# Patient Record
Sex: MALE | Race: WHITE | NOT HISPANIC OR LATINO | Employment: OTHER | URBAN - METROPOLITAN AREA
[De-identification: names, ages, dates, MRNs, and addresses within clinical notes are randomized per-mention and may not be internally consistent; named-entity substitution may affect disease eponyms.]

---

## 2023-09-03 ENCOUNTER — APPOINTMENT (OUTPATIENT)
Dept: RADIOLOGY | Facility: MEDICAL CENTER | Age: 73
End: 2023-09-03
Attending: EMERGENCY MEDICINE
Payer: MEDICARE

## 2023-09-03 ENCOUNTER — HOSPITAL ENCOUNTER (OUTPATIENT)
Facility: MEDICAL CENTER | Age: 73
End: 2023-09-04
Attending: EMERGENCY MEDICINE | Admitting: INTERNAL MEDICINE
Payer: MEDICARE

## 2023-09-03 DIAGNOSIS — I10 HYPERTENSION, UNSPECIFIED TYPE: ICD-10-CM

## 2023-09-03 DIAGNOSIS — R41.82 ALTERED MENTAL STATUS, UNSPECIFIED ALTERED MENTAL STATUS TYPE: ICD-10-CM

## 2023-09-03 DIAGNOSIS — Z79.4 CONTROLLED TYPE 2 DIABETES MELLITUS WITHOUT COMPLICATION, WITH LONG-TERM CURRENT USE OF INSULIN (HCC): ICD-10-CM

## 2023-09-03 DIAGNOSIS — E11.9 CONTROLLED TYPE 2 DIABETES MELLITUS WITHOUT COMPLICATION, WITH LONG-TERM CURRENT USE OF INSULIN (HCC): ICD-10-CM

## 2023-09-03 PROBLEM — Z85.048 PERSONAL HISTORY OF RECTAL CANCER: Status: ACTIVE | Noted: 2023-09-03

## 2023-09-03 LAB
ALBUMIN SERPL BCP-MCNC: 4 G/DL (ref 3.2–4.9)
ALBUMIN/GLOB SERPL: 1.5 G/DL
ALP SERPL-CCNC: 76 U/L (ref 30–99)
ALT SERPL-CCNC: 18 U/L (ref 2–50)
AMMONIA PLAS-SCNC: 16 UMOL/L (ref 11–45)
AMPHET UR QL SCN: NEGATIVE
ANION GAP SERPL CALC-SCNC: 7 MMOL/L (ref 7–16)
APPEARANCE UR: CLEAR
APTT PPP: 26.4 SEC (ref 24.7–36)
AST SERPL-CCNC: 21 U/L (ref 12–45)
BACTERIA #/AREA URNS HPF: NEGATIVE /HPF
BARBITURATES UR QL SCN: NEGATIVE
BASOPHILS # BLD AUTO: 0.5 % (ref 0–1.8)
BASOPHILS # BLD: 0.03 K/UL (ref 0–0.12)
BENZODIAZ UR QL SCN: NEGATIVE
BILIRUB SERPL-MCNC: 0.3 MG/DL (ref 0.1–1.5)
BILIRUB UR QL STRIP.AUTO: NEGATIVE
BUN SERPL-MCNC: 19 MG/DL (ref 8–22)
BZE UR QL SCN: NEGATIVE
CALCIUM ALBUM COR SERPL-MCNC: 9.4 MG/DL (ref 8.5–10.5)
CALCIUM SERPL-MCNC: 9.4 MG/DL (ref 8.5–10.5)
CANNABINOIDS UR QL SCN: NEGATIVE
CHLORIDE SERPL-SCNC: 107 MMOL/L (ref 96–112)
CK SERPL-CCNC: 106 U/L (ref 0–154)
CO2 SERPL-SCNC: 25 MMOL/L (ref 20–33)
COLOR UR: YELLOW
CREAT SERPL-MCNC: 0.75 MG/DL (ref 0.5–1.4)
EKG IMPRESSION: NORMAL
EOSINOPHIL # BLD AUTO: 0.42 K/UL (ref 0–0.51)
EOSINOPHIL NFR BLD: 6.6 % (ref 0–6.9)
EPI CELLS #/AREA URNS HPF: NEGATIVE /HPF
ERYTHROCYTE [DISTWIDTH] IN BLOOD BY AUTOMATED COUNT: 52.4 FL (ref 35.9–50)
ETHANOL BLD-MCNC: <10.1 MG/DL
FENTANYL UR QL: NEGATIVE
GFR SERPLBLD CREATININE-BSD FMLA CKD-EPI: 96 ML/MIN/1.73 M 2
GLOBULIN SER CALC-MCNC: 2.6 G/DL (ref 1.9–3.5)
GLUCOSE BLD STRIP.AUTO-MCNC: 239 MG/DL (ref 65–99)
GLUCOSE SERPL-MCNC: 151 MG/DL (ref 65–99)
GLUCOSE UR STRIP.AUTO-MCNC: 100 MG/DL
HCT VFR BLD AUTO: 36.9 % (ref 42–52)
HGB BLD-MCNC: 12.2 G/DL (ref 14–18)
HYALINE CASTS #/AREA URNS LPF: ABNORMAL /LPF
IMM GRANULOCYTES # BLD AUTO: 0.02 K/UL (ref 0–0.11)
IMM GRANULOCYTES NFR BLD AUTO: 0.3 % (ref 0–0.9)
INR PPP: 1.04 (ref 0.87–1.13)
KETONES UR STRIP.AUTO-MCNC: NEGATIVE MG/DL
LEUKOCYTE ESTERASE UR QL STRIP.AUTO: NEGATIVE
LIPASE SERPL-CCNC: 17 U/L (ref 11–82)
LYMPHOCYTES # BLD AUTO: 1.82 K/UL (ref 1–4.8)
LYMPHOCYTES NFR BLD: 28.6 % (ref 22–41)
MAGNESIUM SERPL-MCNC: 2.1 MG/DL (ref 1.5–2.5)
MCH RBC QN AUTO: 34.1 PG (ref 27–33)
MCHC RBC AUTO-ENTMCNC: 33.1 G/DL (ref 32.3–36.5)
MCV RBC AUTO: 103.1 FL (ref 81.4–97.8)
METHADONE UR QL SCN: NEGATIVE
MICRO URNS: ABNORMAL
MONOCYTES # BLD AUTO: 0.59 K/UL (ref 0–0.85)
MONOCYTES NFR BLD AUTO: 9.3 % (ref 0–13.4)
NEUTROPHILS # BLD AUTO: 3.48 K/UL (ref 1.82–7.42)
NEUTROPHILS NFR BLD: 54.7 % (ref 44–72)
NITRITE UR QL STRIP.AUTO: NEGATIVE
NRBC # BLD AUTO: 0 K/UL
NRBC BLD-RTO: 0 /100 WBC (ref 0–0.2)
OPIATES UR QL SCN: NEGATIVE
OXYCODONE UR QL SCN: NEGATIVE
PCP UR QL SCN: NEGATIVE
PH UR STRIP.AUTO: 5.5 [PH] (ref 5–8)
PHOSPHATE SERPL-MCNC: 3.1 MG/DL (ref 2.5–4.5)
PLATELET # BLD AUTO: 171 K/UL (ref 164–446)
PMV BLD AUTO: 11 FL (ref 9–12.9)
POTASSIUM SERPL-SCNC: 4 MMOL/L (ref 3.6–5.5)
PROPOXYPH UR QL SCN: NEGATIVE
PROT SERPL-MCNC: 6.6 G/DL (ref 6–8.2)
PROT UR QL STRIP: NEGATIVE MG/DL
PROTHROMBIN TIME: 13.8 SEC (ref 12–14.6)
RBC # BLD AUTO: 3.58 M/UL (ref 4.7–6.1)
RBC # URNS HPF: ABNORMAL /HPF
RBC UR QL AUTO: ABNORMAL
SODIUM SERPL-SCNC: 139 MMOL/L (ref 135–145)
SP GR UR STRIP.AUTO: 1.02
TROPONIN T SERPL-MCNC: 18 NG/L (ref 6–19)
TSH SERPL DL<=0.005 MIU/L-ACNC: 0.93 UIU/ML (ref 0.38–5.33)
UROBILINOGEN UR STRIP.AUTO-MCNC: 0.2 MG/DL
WBC # BLD AUTO: 6.4 K/UL (ref 4.8–10.8)
WBC #/AREA URNS HPF: ABNORMAL /HPF

## 2023-09-03 PROCEDURE — 71045 X-RAY EXAM CHEST 1 VIEW: CPT

## 2023-09-03 PROCEDURE — 85025 COMPLETE CBC W/AUTO DIFF WBC: CPT

## 2023-09-03 PROCEDURE — 80053 COMPREHEN METABOLIC PANEL: CPT

## 2023-09-03 PROCEDURE — 87040 BLOOD CULTURE FOR BACTERIA: CPT

## 2023-09-03 PROCEDURE — A9270 NON-COVERED ITEM OR SERVICE: HCPCS | Performed by: EMERGENCY MEDICINE

## 2023-09-03 PROCEDURE — 82077 ASSAY SPEC XCP UR&BREATH IA: CPT

## 2023-09-03 PROCEDURE — 82550 ASSAY OF CK (CPK): CPT

## 2023-09-03 PROCEDURE — 84484 ASSAY OF TROPONIN QUANT: CPT

## 2023-09-03 PROCEDURE — 85610 PROTHROMBIN TIME: CPT

## 2023-09-03 PROCEDURE — 700105 HCHG RX REV CODE 258: Performed by: EMERGENCY MEDICINE

## 2023-09-03 PROCEDURE — 83690 ASSAY OF LIPASE: CPT

## 2023-09-03 PROCEDURE — 84100 ASSAY OF PHOSPHORUS: CPT

## 2023-09-03 PROCEDURE — A9270 NON-COVERED ITEM OR SERVICE: HCPCS | Performed by: INTERNAL MEDICINE

## 2023-09-03 PROCEDURE — 84443 ASSAY THYROID STIM HORMONE: CPT

## 2023-09-03 PROCEDURE — 99285 EMERGENCY DEPT VISIT HI MDM: CPT

## 2023-09-03 PROCEDURE — 81001 URINALYSIS AUTO W/SCOPE: CPT | Mod: XU

## 2023-09-03 PROCEDURE — 70450 CT HEAD/BRAIN W/O DYE: CPT

## 2023-09-03 PROCEDURE — 700111 HCHG RX REV CODE 636 W/ 250 OVERRIDE (IP): Mod: JZ | Performed by: INTERNAL MEDICINE

## 2023-09-03 PROCEDURE — 99223 1ST HOSP IP/OBS HIGH 75: CPT | Performed by: INTERNAL MEDICINE

## 2023-09-03 PROCEDURE — 36415 COLL VENOUS BLD VENIPUNCTURE: CPT

## 2023-09-03 PROCEDURE — 700102 HCHG RX REV CODE 250 W/ 637 OVERRIDE(OP): Performed by: EMERGENCY MEDICINE

## 2023-09-03 PROCEDURE — 93005 ELECTROCARDIOGRAM TRACING: CPT | Performed by: EMERGENCY MEDICINE

## 2023-09-03 PROCEDURE — 700102 HCHG RX REV CODE 250 W/ 637 OVERRIDE(OP): Performed by: INTERNAL MEDICINE

## 2023-09-03 PROCEDURE — G0378 HOSPITAL OBSERVATION PER HR: HCPCS

## 2023-09-03 PROCEDURE — 83735 ASSAY OF MAGNESIUM: CPT

## 2023-09-03 PROCEDURE — 85730 THROMBOPLASTIN TIME PARTIAL: CPT

## 2023-09-03 PROCEDURE — 82962 GLUCOSE BLOOD TEST: CPT

## 2023-09-03 PROCEDURE — 80307 DRUG TEST PRSMV CHEM ANLYZR: CPT

## 2023-09-03 PROCEDURE — 82140 ASSAY OF AMMONIA: CPT

## 2023-09-03 PROCEDURE — 96372 THER/PROPH/DIAG INJ SC/IM: CPT | Mod: XU

## 2023-09-03 RX ORDER — AMLODIPINE BESYLATE 5 MG/1
5 TABLET ORAL DAILY
Status: ON HOLD | COMMUNITY
End: 2023-09-04 | Stop reason: SDUPTHER

## 2023-09-03 RX ORDER — METOPROLOL SUCCINATE 25 MG/1
25 TABLET, EXTENDED RELEASE ORAL DAILY
Status: ON HOLD | COMMUNITY
End: 2023-09-04 | Stop reason: SDUPTHER

## 2023-09-03 RX ORDER — POLYETHYLENE GLYCOL 3350 17 G/17G
1 POWDER, FOR SOLUTION ORAL
Status: DISCONTINUED | OUTPATIENT
Start: 2023-09-03 | End: 2023-09-04 | Stop reason: HOSPADM

## 2023-09-03 RX ORDER — ISOSORBIDE MONONITRATE 30 MG/1
30 TABLET, EXTENDED RELEASE ORAL EVERY MORNING
Status: ON HOLD | COMMUNITY
End: 2023-09-04 | Stop reason: SDUPTHER

## 2023-09-03 RX ORDER — GAUZE BANDAGE 2" X 2"
100 BANDAGE TOPICAL DAILY
Status: DISCONTINUED | OUTPATIENT
Start: 2023-09-04 | End: 2023-09-04 | Stop reason: HOSPADM

## 2023-09-03 RX ORDER — LISINOPRIL 40 MG/1
40 TABLET ORAL DAILY
Status: ON HOLD | COMMUNITY
End: 2023-09-04 | Stop reason: SDUPTHER

## 2023-09-03 RX ORDER — LISINOPRIL 10 MG/1
5 TABLET ORAL ONCE
Status: COMPLETED | OUTPATIENT
Start: 2023-09-03 | End: 2023-09-03

## 2023-09-03 RX ORDER — ROSUVASTATIN CALCIUM 10 MG/1
10 TABLET, COATED ORAL EVERY EVENING
Status: ON HOLD | COMMUNITY
End: 2023-09-04 | Stop reason: SDUPTHER

## 2023-09-03 RX ORDER — LISINOPRIL 20 MG/1
40 TABLET ORAL DAILY
Status: DISCONTINUED | OUTPATIENT
Start: 2023-09-03 | End: 2023-09-04 | Stop reason: HOSPADM

## 2023-09-03 RX ORDER — ONDANSETRON 4 MG/1
4 TABLET, ORALLY DISINTEGRATING ORAL EVERY 4 HOURS PRN
Status: DISCONTINUED | OUTPATIENT
Start: 2023-09-03 | End: 2023-09-04 | Stop reason: HOSPADM

## 2023-09-03 RX ORDER — INSULIN GLARGINE 100 [IU]/ML
35 INJECTION, SOLUTION SUBCUTANEOUS 2 TIMES DAILY
Status: ON HOLD | COMMUNITY
End: 2023-09-04 | Stop reason: SDUPTHER

## 2023-09-03 RX ORDER — ENOXAPARIN SODIUM 100 MG/ML
40 INJECTION SUBCUTANEOUS DAILY
Status: DISCONTINUED | OUTPATIENT
Start: 2023-09-03 | End: 2023-09-04 | Stop reason: HOSPADM

## 2023-09-03 RX ORDER — HYDRALAZINE HYDROCHLORIDE 20 MG/ML
10 INJECTION INTRAMUSCULAR; INTRAVENOUS ONCE
Status: DISCONTINUED | OUTPATIENT
Start: 2023-09-03 | End: 2023-09-04 | Stop reason: HOSPADM

## 2023-09-03 RX ORDER — ACETAMINOPHEN 325 MG/1
650 TABLET ORAL EVERY 6 HOURS PRN
Status: DISCONTINUED | OUTPATIENT
Start: 2023-09-03 | End: 2023-09-04 | Stop reason: HOSPADM

## 2023-09-03 RX ORDER — INSULIN ASPART 100 [IU]/ML
10-25 INJECTION, SOLUTION INTRAVENOUS; SUBCUTANEOUS 2 TIMES DAILY
Status: ON HOLD | COMMUNITY
End: 2023-09-04 | Stop reason: SDUPTHER

## 2023-09-03 RX ORDER — AMOXICILLIN 250 MG
2 CAPSULE ORAL 2 TIMES DAILY
Status: DISCONTINUED | OUTPATIENT
Start: 2023-09-03 | End: 2023-09-04 | Stop reason: HOSPADM

## 2023-09-03 RX ORDER — SODIUM CHLORIDE 9 MG/ML
INJECTION, SOLUTION INTRAVENOUS CONTINUOUS
Status: DISCONTINUED | OUTPATIENT
Start: 2023-09-03 | End: 2023-09-04 | Stop reason: HOSPADM

## 2023-09-03 RX ORDER — ONDANSETRON 2 MG/ML
4 INJECTION INTRAMUSCULAR; INTRAVENOUS EVERY 4 HOURS PRN
Status: DISCONTINUED | OUTPATIENT
Start: 2023-09-03 | End: 2023-09-04 | Stop reason: HOSPADM

## 2023-09-03 RX ORDER — BISACODYL 10 MG
10 SUPPOSITORY, RECTAL RECTAL
Status: DISCONTINUED | OUTPATIENT
Start: 2023-09-03 | End: 2023-09-04 | Stop reason: HOSPADM

## 2023-09-03 RX ORDER — DEXTROSE MONOHYDRATE 25 G/50ML
25 INJECTION, SOLUTION INTRAVENOUS
Status: DISCONTINUED | OUTPATIENT
Start: 2023-09-03 | End: 2023-09-04 | Stop reason: HOSPADM

## 2023-09-03 RX ADMIN — INSULIN HUMAN 2 UNITS: 100 INJECTION, SOLUTION PARENTERAL at 21:51

## 2023-09-03 RX ADMIN — LISINOPRIL 40 MG: 20 TABLET ORAL at 19:34

## 2023-09-03 RX ADMIN — LISINOPRIL 5 MG: 10 TABLET ORAL at 16:41

## 2023-09-03 RX ADMIN — TICAGRELOR 60 MG: 60 TABLET ORAL at 21:51

## 2023-09-03 RX ADMIN — METOPROLOL TARTRATE 25 MG: 25 TABLET, FILM COATED ORAL at 19:34

## 2023-09-03 RX ADMIN — ENOXAPARIN SODIUM 40 MG: 100 INJECTION SUBCUTANEOUS at 21:45

## 2023-09-03 RX ADMIN — SODIUM CHLORIDE: 9 INJECTION, SOLUTION INTRAVENOUS at 17:40

## 2023-09-03 ASSESSMENT — ENCOUNTER SYMPTOMS
CHILLS: 0
FLANK PAIN: 0
COUGH: 0
SPEECH CHANGE: 0
POLYDIPSIA: 0
HEARTBURN: 0
SPUTUM PRODUCTION: 0
ORTHOPNEA: 0
PHOTOPHOBIA: 0
DOUBLE VISION: 0
FEVER: 0
VOMITING: 0
WEIGHT LOSS: 0
BRUISES/BLEEDS EASILY: 0
HEMOPTYSIS: 0
HALLUCINATIONS: 0
BACK PAIN: 0
NERVOUS/ANXIOUS: 0
HEADACHES: 0
FOCAL WEAKNESS: 0
NECK PAIN: 0
TREMORS: 0
NAUSEA: 0
PALPITATIONS: 0
BLURRED VISION: 0

## 2023-09-03 ASSESSMENT — LIFESTYLE VARIABLES
DO YOU DRINK ALCOHOL: NO
SUBSTANCE_ABUSE: 0

## 2023-09-03 ASSESSMENT — FIBROSIS 4 INDEX: FIB4 SCORE: 2.08

## 2023-09-03 ASSESSMENT — PAIN DESCRIPTION - PAIN TYPE: TYPE: ACUTE PAIN

## 2023-09-03 NOTE — ED NOTES
Pt ambulated in with [facility] transfer staff.  Pt was found wandering around for the last 3 days and couldn't find his way back to his camp.  Pt A/O x4.  Pt hasn't has any of his medications for the last 3 days.  H/o DM,  PTA.  Pt rec'd 300 ml NS.  Pt also has h/o colon CA and last Chemo 3 wks ago.  ERP to see.

## 2023-09-03 NOTE — ED PROVIDER NOTES
ER Provider Note    Scribed for Yoselyn Freire M.d. by Marlyn Galvan. 9/3/2023  2:06 PM    Primary Care Provider: No primary care provider noted.    CHIEF COMPLAINT  Chief Complaint   Patient presents with    Other     EXTERNAL RECORDS REVIEWED  Outpatient Notes The patient is a transfer from [facility]. He has a history of colon cancer and CAD. The patient went alone and lost his RV 3 days ago. He has been out there ever since. A&O x1. The patient is supposed to be on lisinopril for his blood pressure.     Old records from [facility] were reviewed.  Patient has history of localized advanced rectal adenocarcinoma.  He is status post 6 cycles of full ferry.  MRI suggested extramural vascular invasion and at least 4 mesorectal/presacral lymph nodes measuring up to 5 mm concerning for malignancy.  Patient was seen there on August 16 in preparation for radiation planning.  Radiation planning is for rectum and pelvic nodes with concurrent oral Xeloda.  Patient's medication list includes Norvasc, insulin, metoprolol, Januvia as of August 17, 2023.    Gastroenterology H&P done on June 29, 2023 states that patient's past medical history includes bilateral carotid artery stenosis, CAD with stents to the LAD and OM.  Patient with chronic venous insufficiency of lower extremity.  He has hyperlipidemia, hypertension and PAD.  He has left popliteal and peroneal artery stenosis with occluded left posterior tibial artery.  He also has diabetes type 2 with diabetic neuropathy.  That particular H&P also includes aspirin, Lipitor, lisinopril and Brilinta as current medications.    HPI/ROS  LIMITATION TO HISTORY   Select: : None  OUTSIDE HISTORIAN(S):  None.     Orlando Faustin is a 73 y.o. male who presents to the ED for evaluation of altered level of consciousness onset earlier today. Per nursing note, the patient was found wandering around for the last 3 days and was unable to find his RV. The patient has not taken any  "of his medications for the last 3 days due to his medications being in his van. He describes that he is not confused and that the rangers were concerned due to the patient not taking his medication. The patient states that his RV has broken down and simply did not write down where he had left his van, so he had forgotten where his van was. The patient states that he first rode a bike to look for his RV, and then he walked and took a shuttle. During the 3 days, the patient was staying with others while searching for his RV. He states that he experienced shortness of breath after riding the bike for a while, which he reports is typical. He also notes that he gets winded after walking up the stairs and states that his doctor has been made aware of this. The patient knows location, month, and year, but is unaware as to who the president is.  He reports that \"Mauricio is the president.\"  He reports that he normally lives in [place]. When asked about who cares for his cancer, at first he denied having anyone treating him for his colon cancer, but then he noted that he has had chemotherapy done. The patient reports that he would receive chemotherapy weekly, but has not gone in about 2 to 3 weeks, as he states that he is being changed to radiation. He states that he was diagnosed with colon cancer last year. The patient has a port to his right chest wall. The patient states he also has slight lightheadedness, but denies any chest pain, cough, abdominal pain, sore throat, or dizziness. The patient states that he experienced the lightheadedness after walking for a while. He denies the use of drugs at [place]. He notes that he is on a blood thinner and takes lisinopril.     PAST MEDICAL HISTORY  Past Medical History:   Diagnosis Date    Cancer (HCC)     Diabetes (HCC)     Hypertension      SURGICAL HISTORY  History reviewed. No pertinent surgical history.    FAMILY HISTORY  History reviewed. No pertinent family " "history.    SOCIAL HISTORY   reports that he has never smoked. He has never used smokeless tobacco. He reports current alcohol use. He reports that he does not currently use drugs.    CURRENT MEDICATIONS  Current Outpatient Medications   Medication Instructions    LISINOPRIL PO Oral    SITagliptin Phosphate (JANUVIA PO) Oral    Ticagrelor (BRILINTA PO) Oral      ALLERGIES  Patient has no known allergies.    PHYSICAL EXAM  BP (!) 180/84   Pulse 62   Temp 36.4 °C (97.6 °F) (Temporal)   Resp 16   Ht 1.803 m (5' 11\")   Wt 81.6 kg (180 lb)   SpO2 98%   BMI 25.10 kg/m²     Constitutional: Well developed, well nourished; No acute distress; Non-toxic appearance.  Covered in [place] mud.  HENT: Normocephalic, atraumatic; Bilateral external ears normal; Oropharynx with moist mucous membranes;   Eyes: PERRL, EOMI, Conjunctiva normal. No discharge.   Neck:  Supple, nontender midline; No stridor; No nuchal rigidity.   Cardiovascular: Regular rate and rhythm without murmurs, rubs, or gallop.   Thorax & Lungs: No respiratory distress, breath sounds clear to auscultation bilaterally without wheezing, rales or rhonchi. Nontender chest wall. No crepitus or subcutaneous air. Port in the right chest wall.   Abdomen: Soft, nontender, bowel sounds normal. No obvious masses; No pulsatile masses; no rebound, guarding, or peritoneal signs. Mild distension of the abdomen.   Skin: Good color; warm and dry without rash or petechia.  Back: Nontender, No CVA tenderness.   Extremities: Distal radial, dorsalis pedis, posterior tibial pulses are equal bilaterally; Nontender calves or saphenous, No cyanosis, No clubbing. Feet are covered with mud. 2+ pitting edema to ankles.  Musculoskeletal: Good range of motion in all major joints. No tenderness to palpation or major deformities noted.   Neurologic: Alert & oriented x 4, clear speech, normal gait.   are 5 out of 5 and equal bilaterally.  No facial asymmetry.    DIAGNOSTIC " STUDIES    Labs:   Results for orders placed or performed during the hospital encounter of 09/03/23   MAGNESIUM   Result Value Ref Range    Magnesium 2.1 1.5 - 2.5 mg/dL   CBC WITH DIFFERENTIAL   Result Value Ref Range    WBC 6.4 4.8 - 10.8 K/uL    RBC 3.58 (L) 4.70 - 6.10 M/uL    Hemoglobin 12.2 (L) 14.0 - 18.0 g/dL    Hematocrit 36.9 (L) 42.0 - 52.0 %    .1 (H) 81.4 - 97.8 fL    MCH 34.1 (H) 27.0 - 33.0 pg    MCHC 33.1 32.3 - 36.5 g/dL    RDW 52.4 (H) 35.9 - 50.0 fL    Platelet Count 171 164 - 446 K/uL    MPV 11.0 9.0 - 12.9 fL    Neutrophils-Polys 54.70 44.00 - 72.00 %    Lymphocytes 28.60 22.00 - 41.00 %    Monocytes 9.30 0.00 - 13.40 %    Eosinophils 6.60 0.00 - 6.90 %    Basophils 0.50 0.00 - 1.80 %    Immature Granulocytes 0.30 0.00 - 0.90 %    Nucleated RBC 0.00 0.00 - 0.20 /100 WBC    Neutrophils (Absolute) 3.48 1.82 - 7.42 K/uL    Lymphs (Absolute) 1.82 1.00 - 4.80 K/uL    Monos (Absolute) 0.59 0.00 - 0.85 K/uL    Eos (Absolute) 0.42 0.00 - 0.51 K/uL    Baso (Absolute) 0.03 0.00 - 0.12 K/uL    Immature Granulocytes (abs) 0.02 0.00 - 0.11 K/uL    NRBC (Absolute) 0.00 K/uL   COMP METABOLIC PANEL   Result Value Ref Range    Sodium 139 135 - 145 mmol/L    Potassium 4.0 3.6 - 5.5 mmol/L    Chloride 107 96 - 112 mmol/L    Co2 25 20 - 33 mmol/L    Anion Gap 7.0 7.0 - 16.0    Glucose 151 (H) 65 - 99 mg/dL    Bun 19 8 - 22 mg/dL    Creatinine 0.75 0.50 - 1.40 mg/dL    Calcium 9.4 8.5 - 10.5 mg/dL    Correct Calcium 9.4 8.5 - 10.5 mg/dL    AST(SGOT) 21 12 - 45 U/L    ALT(SGPT) 18 2 - 50 U/L    Alkaline Phosphatase 76 30 - 99 U/L    Total Bilirubin 0.3 0.1 - 1.5 mg/dL    Albumin 4.0 3.2 - 4.9 g/dL    Total Protein 6.6 6.0 - 8.2 g/dL    Globulin 2.6 1.9 - 3.5 g/dL    A-G Ratio 1.5 g/dL   LIPASE   Result Value Ref Range    Lipase 17 11 - 82 U/L   TROPONIN   Result Value Ref Range    Troponin T 18 6 - 19 ng/L   URINALYSIS (UA)    Specimen: Urine   Result Value Ref Range    Color Yellow     Character Clear      Specific Gravity 1.018 <1.035    Ph 5.5 5.0 - 8.0    Glucose 100 (A) Negative mg/dL    Ketones Negative Negative mg/dL    Protein Negative Negative mg/dL    Bilirubin Negative Negative    Urobilinogen, Urine 0.2 Negative    Nitrite Negative Negative    Leukocyte Esterase Negative Negative    Occult Blood Trace (A) Negative    Micro Urine Req Microscopic    AMMONIA   Result Value Ref Range    Ammonia 16 11 - 45 umol/L   APTT   Result Value Ref Range    APTT 26.4 24.7 - 36.0 sec   PROTHROMBIN TIME (INR)   Result Value Ref Range    PT 13.8 12.0 - 14.6 sec    INR 1.04 0.87 - 1.13   URINE DRUG SCREEN   Result Value Ref Range    Amphetamines Urine Negative Negative    Barbiturates Negative Negative    Benzodiazepines Negative Negative    Cocaine Metabolite Negative Negative    Fentanyl, Urine Negative Negative    Methadone Negative Negative    Opiates Negative Negative    Oxycodone Negative Negative    Phencyclidine -Pcp Negative Negative    Propoxyphene Negative Negative    Cannabinoid Metab Negative Negative   DIAGNOSTIC ALCOHOL   Result Value Ref Range    Diagnostic Alcohol <10.1 <10.1 mg/dL   ESTIMATED GFR   Result Value Ref Range    GFR (CKD-EPI) 96 >60 mL/min/1.73 m 2   URINE MICROSCOPIC (W/UA)   Result Value Ref Range    WBC 0-2 (A) /hpf    RBC 0-2 (A) /hpf    Bacteria Negative None /hpf    Epithelial Cells Negative /hpf    Hyaline Cast 0-2 /lpf   TSH WITH REFLEX TO FT4   Result Value Ref Range    TSH 0.930 0.380 - 5.330 uIU/mL   CREATINE KINASE   Result Value Ref Range    CPK Total 106 0 - 154 U/L   PHOSPHORUS   Result Value Ref Range    Phosphorus 3.1 2.5 - 4.5 mg/dL   EKG (NOW)   Result Value Ref Range    Report       [facility] Emergency Dept.    Test Date:  2023  Pt Name:    ALICIA GRIFFITH                Department: ER  MRN:        [id number]                      Room:        21  Gender:     Male                         Technician: [id number]  :        1950                    Requested By:FRANCOISE FREIRE  Order #:    [id number]                    Reading MD: Francoise Freire    Measurements  Intervals                                Axis  Rate:       57                           P:          -33  TX:         169                          QRS:        23  QRSD:       96                           T:          53  QT:         442  QTc:        431    Interpretive Statements  Sinus bradycardia rate 57  Normal axis  Normal intervals  T waves flat throughout  No ST elevation or depression  Anteroseptal infarct, old  No previous ECG available for comparison  Electronically Signed On 09- 16:28:22 PDT by Francoise Freire     POCT glucose device results   Result Value Ref Range    POC Glucose, Blood 239 (H) 65 - 99 mg/dL      EKG:   I have independently interpreted this EKG  Please see my EKG interpretation above    Radiology:   The attending emergency physician has independently interpreted the diagnostic imaging associated with this visit and am waiting the final reading from the radiologist.     Preliminary interpretation is a follows: ER MD is reviewed the patient's CT brain.  No obvious head bleed.    Radiologist interpretation:   CT-HEAD W/O   Final Result         1.  No acute intracranial abnormality.         DX-CHEST-PORTABLE (1 VIEW)   Final Result      No acute cardiopulmonary abnormality.         COURSE & MEDICAL DECISION MAKING     ED Observation Status? No; the patient does not meet criteria for ED Observation.     INITIAL ASSESSMENT, COURSE AND PLAN  Care Narrative: Patient presents to the ER with concerns for confusion/altered mental status.  The patient is a colorectal cancer patient who follows with [facility] down in [place].  He was added [place] by himself.  He said he forgot where he parked his van and has been wandering around for 3 days.  Since he could not find his van, he simply just went into other peoples camps where they gave him food, clothes and shelter.  He ended up at a medical  "tent today due to concern that Rangers had over his confusion.  He was alert and orient x1 out of the medical tent and burning man.  It was reported he been off his medications and that was also of concern.  They transferred here to the ER for evaluation.  Here in the ER the patient is oriented to person, place, city of residence, year and month.  It took him a little while to think of the month but he was able to answer accurately.  When asked to the present was he said \"[place].\"  When I told him that the president was Lori, he seemed surprised.  Patient has had no headache.  No chest pain.  No shortness of breath.  No abdominal pain.  No complaints of numbness, tingling or weakness of extremities.  No focal deficits on examination.  At this time I do not think he isHaving stroke.  Patient is afebrile.  No concern for encephalitis or meningitis.  CT brain is negative for any acute head bleed.  EKG is nonacute.  Blood work is unremarkable.  Patient's blood pressure was quite high but again has been off his medications for 3 days.  Patient certainly seems to be improved from a mental status standpoint when compared to how he was described out it [place].  He has no wallet.  He has no cell phone.  His van is out of [place], and all of his belongings are apparently in his van.   has seen the patient and is aware of the situation.  At this time given patient's marked confusion is reported prior to arrival in his mild persistent confusion here in the ER, he will be hospitalized for further evaluation and work-up.  I spoke with the hospitalist on-call and he will kindly evaluate the patient hospitalization.     2:10 PM - Patient seen and examined at bedside. Discussed plan of care, including performing an EKG, lab work, and imaging. Patient agrees to the plan of care.     4:27 PM - The patient will be treated with lisinopril 5 mg.     4:51 PM - The patient will be treated with fluids and hydralazine 10 " mg.     5:28 PM - Patient was reevaluated at bedside. Discussed lab and radiology results with the patient and informed them that the CT of his head, as well as his labs are reassuring. I informed the patient that he will be hospitalized for the night so further evaluation can be done, as well as to ensure that he is fit to leave. He is agreeable to the plan of care.     HYDRATION: Based on the patient's presentation of Dehydration and Eld[place] ALOC the patient was given IV fluids. IV Hydration was used because oral hydration was not adequate alone. Upon recheck following hydration, the patient was improved.  ADDITIONAL PROBLEM LIST  Problem #1: Confusion/altered mental status  Primary 2: Hypertension    DISPOSITION AND DISCUSSIONS  I have discussed management of the patient with the following physicians and JOSSE's:  Dr. Ahumada (Hospitalist)    Discussion of management with other [facility] or appropriate source(s): None     Escalation of care considered, and ultimately not performed: diagnostic imaging.    Barriers to care at this time, including but not limited to: Patient is currently visiting from [place].  He was at a [place].  Wallet, keys, cell phone, vehicle all out of [place] and patient does not have any of his belongings.    Decision tools and prescription drugs considered including, but not limited to: NIH Stroke Scale 0 .    DISPOSITION:  Patient will be hospitalized by Dr. Ahumada in guarded condition.     FINAL DIAGNOSIS  1. Altered mental status, unspecified altered mental status type Acute   2. Hypertension, unspecified type Acute      I, Marlyn Galvan (Scribe), am scribing for, and in the presence of, Yoselyn Freire M.D..    Electronically signed by: Marlyn Galvan (Scribe), 9/3/2023    IYoselyn M.D. personally performed the services described in this documentation, as scribed by Marlyn Galvan in my presence, and it is both accurate and  complete.     This dictation has been created using voice recognition software. The accuracy of the dictation is limited by the abilities of the software. I expect there may be some errors of grammar and possibly content. I made every attempt to manually correct the errors within my dictation. However, errors related to voice recognition software may still exist and should be interpreted within the appropriate context.      The note accurately reflects work and decisions made by me.  Yoselyn Freire M.D.  9/3/2023  5:43 PM

## 2023-09-04 ENCOUNTER — PHARMACY VISIT (OUTPATIENT)
Dept: PHARMACY | Facility: MEDICAL CENTER | Age: 73
End: 2023-09-04
Payer: MEDICARE

## 2023-09-04 VITALS
BODY MASS INDEX: 26.57 KG/M2 | DIASTOLIC BLOOD PRESSURE: 77 MMHG | HEIGHT: 70 IN | WEIGHT: 185.63 LBS | OXYGEN SATURATION: 99 % | HEART RATE: 50 BPM | TEMPERATURE: 98 F | RESPIRATION RATE: 16 BRPM | SYSTOLIC BLOOD PRESSURE: 164 MMHG

## 2023-09-04 PROBLEM — R41.82 AMS (ALTERED MENTAL STATUS): Status: RESOLVED | Noted: 2023-09-03 | Resolved: 2023-09-04

## 2023-09-04 LAB — GLUCOSE BLD STRIP.AUTO-MCNC: 165 MG/DL (ref 65–99)

## 2023-09-04 PROCEDURE — 97602 WOUND(S) CARE NON-SELECTIVE: CPT

## 2023-09-04 PROCEDURE — 700102 HCHG RX REV CODE 250 W/ 637 OVERRIDE(OP): Performed by: INTERNAL MEDICINE

## 2023-09-04 PROCEDURE — 96372 THER/PROPH/DIAG INJ SC/IM: CPT | Mod: XU

## 2023-09-04 PROCEDURE — RXMED WILLOW AMBULATORY MEDICATION CHARGE: Performed by: NURSE PRACTITIONER

## 2023-09-04 PROCEDURE — 99239 HOSP IP/OBS DSCHRG MGMT >30: CPT | Mod: FS | Performed by: INTERNAL MEDICINE

## 2023-09-04 PROCEDURE — G0378 HOSPITAL OBSERVATION PER HR: HCPCS

## 2023-09-04 PROCEDURE — 700105 HCHG RX REV CODE 258: Performed by: EMERGENCY MEDICINE

## 2023-09-04 PROCEDURE — A9270 NON-COVERED ITEM OR SERVICE: HCPCS | Performed by: INTERNAL MEDICINE

## 2023-09-04 PROCEDURE — 82962 GLUCOSE BLOOD TEST: CPT

## 2023-09-04 RX ORDER — METOPROLOL SUCCINATE 25 MG/1
25 TABLET, EXTENDED RELEASE ORAL DAILY
Qty: 30 TABLET | Refills: 0 | Status: SHIPPED | OUTPATIENT
Start: 2023-09-04 | End: 2023-10-04

## 2023-09-04 RX ORDER — INSULIN GLARGINE 100 [IU]/ML
35 INJECTION, SOLUTION SUBCUTANEOUS 2 TIMES DAILY
Qty: 21 ML | Refills: 0 | Status: SHIPPED | OUTPATIENT
Start: 2023-09-04 | End: 2023-10-04

## 2023-09-04 RX ORDER — ISOSORBIDE MONONITRATE 30 MG/1
30 TABLET, EXTENDED RELEASE ORAL EVERY MORNING
Qty: 30 TABLET | Refills: 0 | Status: SHIPPED | OUTPATIENT
Start: 2023-09-04 | End: 2023-10-04

## 2023-09-04 RX ORDER — ROSUVASTATIN CALCIUM 10 MG/1
10 TABLET, COATED ORAL EVERY EVENING
Qty: 30 TABLET | Refills: 0 | Status: SHIPPED | OUTPATIENT
Start: 2023-09-04 | End: 2023-10-04

## 2023-09-04 RX ORDER — DIPHENHYDRAMINE HYDROCHLORIDE 25 MG/1
CAPSULE, LIQUID FILLED ORAL
Qty: 1 KIT | Refills: 0 | Status: SHIPPED | OUTPATIENT
Start: 2023-09-04

## 2023-09-04 RX ORDER — GLUCOSAMINE HCL/CHONDROITIN SU 500-400 MG
CAPSULE ORAL
Qty: 100 EACH | Refills: 0 | Status: SHIPPED | OUTPATIENT
Start: 2023-09-04

## 2023-09-04 RX ORDER — LISINOPRIL 40 MG/1
40 TABLET ORAL DAILY
Qty: 30 TABLET | Refills: 0 | Status: SHIPPED | OUTPATIENT
Start: 2023-09-04 | End: 2023-10-04

## 2023-09-04 RX ORDER — AMLODIPINE BESYLATE 5 MG/1
5 TABLET ORAL DAILY
Qty: 30 TABLET | Refills: 0 | Status: SHIPPED | OUTPATIENT
Start: 2023-09-04 | End: 2023-10-04

## 2023-09-04 RX ORDER — LANCETS 30 GAUGE
EACH MISCELLANEOUS
Qty: 100 EACH | Refills: 0 | Status: SHIPPED | OUTPATIENT
Start: 2023-09-04

## 2023-09-04 RX ADMIN — INSULIN HUMAN 1 UNITS: 100 INJECTION, SOLUTION PARENTERAL at 06:43

## 2023-09-04 RX ADMIN — Medication 100 MG: at 06:33

## 2023-09-04 RX ADMIN — TICAGRELOR 60 MG: 60 TABLET ORAL at 06:33

## 2023-09-04 RX ADMIN — METOPROLOL TARTRATE 25 MG: 25 TABLET, FILM COATED ORAL at 06:45

## 2023-09-04 RX ADMIN — SODIUM CHLORIDE: 9 INJECTION, SOLUTION INTRAVENOUS at 06:22

## 2023-09-04 ASSESSMENT — LIFESTYLE VARIABLES
HOW MANY TIMES IN THE PAST YEAR HAVE YOU HAD 5 OR MORE DRINKS IN A DAY: 0
TOTAL SCORE: 0
AVERAGE NUMBER OF DAYS PER WEEK YOU HAVE A DRINK CONTAINING ALCOHOL: 0
EVER HAD A DRINK FIRST THING IN THE MORNING TO STEADY YOUR NERVES TO GET RID OF A HANGOVER: NO
TOTAL SCORE: 0
ALCOHOL_USE: NO
ON A TYPICAL DAY WHEN YOU DRINK ALCOHOL HOW MANY DRINKS DO YOU HAVE: 0
HAVE PEOPLE ANNOYED YOU BY CRITICIZING YOUR DRINKING: NO
CONSUMPTION TOTAL: NEGATIVE
HAVE YOU EVER FELT YOU SHOULD CUT DOWN ON YOUR DRINKING: NO
EVER FELT BAD OR GUILTY ABOUT YOUR DRINKING: NO
TOTAL SCORE: 0

## 2023-09-04 ASSESSMENT — PATIENT HEALTH QUESTIONNAIRE - PHQ9
2. FEELING DOWN, DEPRESSED, IRRITABLE, OR HOPELESS: NOT AT ALL
SUM OF ALL RESPONSES TO PHQ9 QUESTIONS 1 AND 2: 0
1. LITTLE INTEREST OR PLEASURE IN DOING THINGS: NOT AT ALL

## 2023-09-04 NOTE — DISCHARGE PLANNING
Case Management Discharge Planning    Admission Date: 9/3/2023  GMLOS:    ALOS: 0    6-Clicks ADL Score:    6-Clicks Mobility Score:        Anticipated Discharge Dispo: Discharge Disposition: Discharged to home/self care (01) with close OP follow up    DME Needed: No    Action(s) Taken: Updated Provider/Nurse on Discharge Plan  This RN CM assisted Pt with Approved Service  for Diabetes supplies for the ff: True Metrix strips $75.80  and Lancets $8.70 and the total cost is $84.50.   ASF faxed to [facility].    Escalations Completed: None    Medically Clear: Yes    Next Steps:   CM to continue to assist Pt with discharge as needed    Barriers to Discharge: None    Is the patient up for discharge tomorrow: No

## 2023-09-04 NOTE — H&P
Hospital Medicine History & Physical Note    Date of Service  9/3/2023    Primary Care Physician  Pcp Pt States None      Code Status  Full Code    Chief Complaint  Chief Complaint   Patient presents with    Other       History of Presenting Illness  Orlando Faustin is a 73 y.o. male with past medical history of type 2 diabetes, recurrent colorectal cancer T3N2 status postchemotherapy, scheduled for radiation, CAD with stents placement, hypertension, bilateral internal carotid artery stenosis (greater than 70% of the left side), peripheral artery disease who presented 9/3/2023 with reported altered mental status.  Patient was brought from [facility] with concern for possible altered mental status.  It was reported that patient was he is well and has been looking for it for 3 days, staying in the other people tents, and eventually was drawn attention of medical team.  It was thought that he was alert oriented x1 today, however currently is alert active x4, although may be slightly confused.  He denies any confusion.  He denies to have focal deficit.  He states that he has been drinking viktoria 1-2 drinks a day, otherwise denies drinking or using any drugs.  He has been off his medication for diabetes and hypertension for 3 days, as his medications were in his van.  In ER he has blood pressure 180/84, UDS negative, TSH within normal limits, UA negative for infection, alcohol is not elevated chemistry panel without acute abnormalities.  CT head without contrast showed no acute intracranial abnormality.  Chest x-ray showed no acute abnormality.  EKG without acute T/ST changes, some nonspecific T wave flattening noted.  Requested admission for observation.    I discussed the plan of care with patient and ERP .    Review of Systems  Review of Systems   Constitutional:  Negative for chills, fever and weight loss.   HENT:  Negative for ear pain, hearing loss and tinnitus.    Eyes:  Negative for blurred vision, double  vision and photophobia.   Respiratory:  Negative for cough, hemoptysis and sputum production.    Cardiovascular:  Negative for chest pain, palpitations and orthopnea.   Gastrointestinal:  Negative for heartburn, nausea and vomiting.   Genitourinary:  Negative for dysuria, flank pain, frequency and hematuria.   Musculoskeletal:  Negative for back pain, joint pain and neck pain.   Skin:  Negative for itching and rash.   Neurological:  Negative for tremors, speech change, focal weakness and headaches.   Endo/Heme/Allergies:  Negative for environmental allergies and polydipsia. Does not bruise/bleed easily.   Psychiatric/Behavioral:  Negative for hallucinations and substance abuse. The patient is not nervous/anxious.        Past Medical History   has a past medical history of Cancer (HCC), Diabetes (HCC), and Hypertension.    Surgical History   has no past surgical history on file.     Family History  family history is not on file.   Family history reviewed with patient. There is no family history that is pertinent to the chief complaint.     Social History   reports that he has never smoked. He has never used smokeless tobacco. He reports current alcohol use. He reports that he does not currently use drugs.    Allergies  No Known Allergies    Medications  Prior to Admission Medications   Prescriptions Last Dose Informant Patient Reported? Taking?   LISINOPRIL PO   Yes Yes   Sig: Take  by mouth.   SITagliptin Phosphate (JANUVIA PO)   Yes Yes   Sig: Take  by mouth.   Ticagrelor (BRILINTA PO)   Yes Yes   Sig: Take  by mouth.      Facility-Administered Medications: None       Physical Exam  Temp:  [36.4 °C (97.6 °F)] 36.4 °C (97.6 °F)  Pulse:  [56-64] 61  Resp:  [16] 16  BP: (163-193)/(71-93) 171/79  SpO2:  [96 %-98 %] 96 %  Blood Pressure : (!) 171/79   Temperature: 36.4 °C (97.6 °F)   Pulse: 61   Respiration: 16   Pulse Oximetry: 96 %       Physical Exam  Vitals and nursing note reviewed.   Constitutional:       General:  "He is not in acute distress.     Appearance: Normal appearance.   HENT:      Head: Normocephalic and atraumatic.      Nose: Nose normal.      Mouth/Throat:      Mouth: Mucous membranes are moist.   Eyes:      Extraocular Movements: Extraocular movements intact.      Pupils: Pupils are equal, round, and reactive to light.   Cardiovascular:      Rate and Rhythm: Normal rate and regular rhythm.   Pulmonary:      Effort: Pulmonary effort is normal.      Breath sounds: Normal breath sounds.   Abdominal:      General: Abdomen is flat. There is no distension.      Tenderness: There is no abdominal tenderness.   Musculoskeletal:         General: No swelling or deformity. Normal range of motion.      Cervical back: Normal range of motion and neck supple.   Skin:     General: Skin is warm and dry.   Neurological:      General: No focal deficit present.      Mental Status: He is alert and oriented to person, place, and time.   Psychiatric:         Mood and Affect: Mood normal.         Behavior: Behavior normal.         Laboratory:  Recent Labs     09/03/23  1400   WBC 6.4   RBC 3.58*   HEMOGLOBIN 12.2*   HEMATOCRIT 36.9*   .1*   MCH 34.1*   MCHC 33.1   RDW 52.4*   PLATELETCT 171   MPV 11.0     Recent Labs     09/03/23  1400   SODIUM 139   POTASSIUM 4.0   CHLORIDE 107   CO2 25   GLUCOSE 151*   BUN 19   CREATININE 0.75   CALCIUM 9.4     Recent Labs     09/03/23  1400   ALTSGPT 18   ASTSGOT 21   ALKPHOSPHAT 76   TBILIRUBIN 0.3   LIPASE 17   GLUCOSE 151*     Recent Labs     09/03/23  1400   APTT 26.4   INR 1.04     No results for input(s): \"NTPROBNP\" in the last 72 hours.      Recent Labs     09/03/23  1400   TROPONINT 18       Imaging:  CT-HEAD W/O   Final Result         1.  No acute intracranial abnormality.         DX-CHEST-PORTABLE (1 VIEW)   Final Result      No acute cardiopulmonary abnormality.          X-Ray:  I have personally reviewed the images and compared with prior images.    Assessment/Plan:  Justification " for Admission Status  I anticipate this patient is appropriate for observation status at this time because AMS    Patient will need a Telemetry bed on MEDICAL service .  The need is secondary to AMS.    * AMS (altered mental status)- (present on admission)  Assessment & Plan  Patient was brought from [facility] with reported AO x1, currently AAO x4, no motor deficit  CT head without contrast, alcohol test, urine drug screen, TSH, CPK unrevealing  Plan to observe with neurochecks and vitals every 4 hours till tomorrow      Diabetes type 2, controlled (HCC)  Assessment & Plan  Patient states he takes NPH insulin as needed.  We will hold the Januvia and place on insulin sliding scale, hypoglycemia protocol    Personal history of rectal cancer  Assessment & Plan  He is scheduled for radiation treatment on September 7 at [place]  T3N2, status postchemotherapy    Hypertension  Assessment & Plan  Uncontrolled  Restart lisinopril, metoprolol  Monitor blood pressure        VTE prophylaxis: enoxaparin ppx

## 2023-09-04 NOTE — PROGRESS NOTES
Tele Monitor Summar:  Rhythm: sinus jessica/reg  HR: 57 - 77  Ectopy: occasional PVCs  0.15 / 0.07 / 0.43

## 2023-09-04 NOTE — CARE PLAN
The patient is Stable - Low risk of patient condition declining or worsening    Shift Goals  Clinical Goals: BG Checks, Hemodynamics, Discharge  Patient Goals: Rest, Comfort  Family Goals: ORLIN    Progress made toward(s) clinical / shift goals:     Problem: Knowledge Deficit - Standard  Goal: Patient and family/care givers will demonstrate understanding of plan of care, disease process/condition, diagnostic tests and medications    Description  Target End Date: 1-3 days or as soon as patient condition allows     Document in Patient Education   1. Patient and family/caregiver oriented to unit, equipment, visitation policy and means for communicating concern   2. Complete/review Learning Assessment   3. Assess knowledge level of disease process/condition, treatment plan, diagnostic tests and medications   4. Explain disease process/condition, treatment plan, diagnostic tests and medications    Outcome: Progressing  Note: Patient updated on plan of care, and patient care for shift. Patient able to process instructions and explanations. Patient responsive to commands.    Problem: Fall Risk  Goal: Patient will remain free from falls    Description  Target End Date: Prior to discharge or change in level of care     Document interventions on the Louistavo Concepcion Fall Risk Assessment   1. Assess for fall risk factors   2. Implement fall precautions    Outcome: Progressing  Note: Encouraged and instructed to call for assistance. Side rails up. Call button within reach. Placed on standby assists.    Problem: Hemodynamics  Goal: Patient's hemodynamics, fluid balance and neurologic status will be stable or improve    Description  Target End Date: Prior to discharge or change in level of care     Document on Assessment and I/O flowsheet templates     1. Monitor vital signs, pulse oximetry and cardiac monitor per provider order and/or policy   2. Maintain blood pressure per provider order   3. Hemodynamic monitoring per provider order    4. Manage IV fluids and IV infusions   5. Monitor intake and output   6. Daily weights per unit policy or provider order   7. Assess peripheral pulses and capillary refill   8. Assess color and body temperature     Outcome: Progressing  Note:  Monitored vitals and labs for abnormalities. Encouraged oral hydration and ensure IVF is infusing well. Telemetry monitoring in place, monitored for changes. .

## 2023-09-04 NOTE — PROGRESS NOTES
The patient's daughter Tasha sent $200 via Venmo into my (Kay Sommer') personal bank account.   I pulled $200 from the BIBI and gave it to the patient so that he can get to his home safely.   Transfer of money witnessed by Tammy Peter RN, charge RN.

## 2023-09-04 NOTE — PROGRESS NOTES
Report received from night shift RN. Patient A&O, in bed resting comfortably. VSS, denies pain, bed in lowest position and locked, call light in reach.      Patient informed he will likely discharge early this morning and that he needs to contact family to have them arrange transport back to [facility].

## 2023-09-04 NOTE — PROGRESS NOTES
Clothing, boots, a bus pass, and bus route map have been provided. Daughter emailed a copy of his passport and I have printed it out and gave to patient. Patient has a plan with family on how to get back to van at [facility].

## 2023-09-04 NOTE — PROGRESS NOTES
Pt arrived to unit via wheelchair at 1820. Pt oriented to room, unit, and plan of care. Tele-monitor placed and monitor room notified. All questions answered at this time. Call light within reach; fall precautions in place.

## 2023-09-04 NOTE — WOUND TEAM
[facility]  Inpatient Services  Wound and Skin Care Brief Evaluation    Admission Date: 9/3/2023     Last order of IP CONSULT TO WOUND CARE was found on 9/3/2023 from Hospital Encounter on 9/3/2023     HPI, PMH, SH: Reviewed    Chief Complaint   Patient presents with    Other     Diagnosis: AMS (altered mental status) [R41.82]    Unit where seen by Wound Team: T212    Wound consult placed regarding bilateral feet. Chart and images reviewed. This clinician in to assess patient. Patient pleasant and agreeable. Bilateral feet. Non-selectively debrided with No rinse foam soap and Moist warm washcloth. Feet are intact and lotion generously applied bilaterally.      No pressure injuries or advanced wound care needs identified. Wound consult completed. No further follow up unless indicated and consulted.          PREVENTATIVE INTERVENTIONS:    Q shift Lenny - performed per nursing policy  Q shift pressure point assessments - performed per nursing policy    Surface/Positioning  Standard/trauma mattress - Currently in Place    Mobilization      Ambulating at Baseline

## 2023-09-04 NOTE — PROGRESS NOTES
4 Eyes Skin Assessment Completed by FAZAL Millan and FAZAL Carmona.    Head WDL  Ears WDL  Nose WDL  Mouth WDL  Neck WDL  Breast/Chest WDL  Shoulder Blades WDL  Spine WDL  (R) Arm/Elbow/Hand Bruising and Scab  (L) Arm/Elbow/Hand Bruising  Abdomen WDL  Groin WDL  Scrotum/Coccyx/Buttocks WDL  (R) Leg WDL  (L) Leg WDL  (R) Heel/Foot/Toe Flaky, Dry, Partially removed nail on right big toe  (L) Heel/Foot/Toe Flaky, Dry    Devices In Places None      Interventions In Place Pillows    Possible Skin Injury No    Pictures Uploaded Into Epic Yes  Wound Consult Placed Yes  RN Wound Prevention Protocol Ordered No

## 2023-09-04 NOTE — ASSESSMENT & PLAN NOTE
Patient states he takes NPH insulin as needed.  We will hold the Januvia and place on insulin sliding scale, hypoglycemia protocol

## 2023-09-04 NOTE — ASSESSMENT & PLAN NOTE
Patient was brought from [facility] with reported AO x1, currently AAO x4, no motor deficit  CT head without contrast, alcohol test, urine drug screen, TSH, CPK unrevealing  Plan to observe with neurochecks and vitals every 4 hours till tomorrow

## 2023-09-04 NOTE — DISCHARGE INSTRUCTIONS
FOLLOW UP ITEMS POST DISCHARGE  Please call [phone] to schedule PCP appointment for patient.    Required specialty appointments include:       Discharge Instructions per STUART Quick    -Follow-up with PCP in [facility] for management of care  -All medications including insulin, antihypertensive, glucometers, and diabetic supplies all have been prescribed for the next 30 days  -Continue all home medications as indicated  -Monitor your blood pressure and blood sugars at least twice daily    DIET: Diabetic and cardiac    ACTIVITY: As tolerated    DIAGNOSIS: Altered mentation    Return to ER if symptoms persist, chest pain, palpitations, shortness of breath, numbness, tingling, weakness, and high fevers.    Discharge Instructions    Discharged to home by car with self. Discharged via wheelchair, hospital escort: Yes.  Special equipment needed: Not Applicable    Be sure to schedule a follow-up appointment with your primary care doctor or any specialists as instructed.     Discharge Plan:   Diet Plan: Discussed  Activity Level: Discussed  Confirmed Follow up Appointment: Patient to Call and Schedule Appointment  Confirmed Symptoms Management: Discussed    I understand that a diet low in cholesterol, fat, and sodium is recommended for good health. Unless I have been given specific instructions below for another diet, I accept this instruction as my diet prescription.   Other diet: regular    Special Instructions: None    -Is this patient being discharged with medication to prevent blood clots?  No    Is patient discharged on Warfarin / Coumadin?   No       Altered Mental Status  Altered mental status most often refers to an abnormal change in your responsiveness and awareness. It can affect your speech, thought, mobility, memory, attention span, or alertness. It can range from slight confusion to complete unresponsiveness (coma). Altered mental status can be a sign of a serious  underlying medical condition. Rapid evaluation and medical treatment is necessary for patients having an altered mental status.  CAUSES   Low blood sugar (hypoglycemia) or diabetes.  Severe loss of body fluids (dehydration) or a body salt (electrolyte) imbalance.  A stroke or other neurologic problem, such as dementia or delirium.  A head injury or tumor.  A drug or alcohol overdose.  Exposure to toxins or poisons.  Depression, anxiety, and stress.  A low oxygen level (hypoxia).  An infection.  Blood loss.  Twitching or shaking (seizure).  Heart problems, such as heart attack or heart rhythm problems (arrhythmias).  A body temperature that is too low or too high (hypothermia or hyperthermia).  DIAGNOSIS   A diagnosis is based on your history, symptoms, physical and neurologic examinations, and diagnostic tests. Diagnostic tests may include:  Measurement of your blood pressure, pulse, breathing, and oxygen levels (vital signs).  Blood tests.  Urine tests.  X-ray exams.  A computerized magnetic scan (magnetic resonance imaging, MRI).  A computerized X-ray scan (computed tomography, CT scan).  TREATMENT   Treatment will depend on the cause. Treatment may include:  Management of an underlying medical or mental health condition.  Critical care or support in the hospital.  HOME CARE INSTRUCTIONS   Only take over-the-counter or prescription medicines for pain, discomfort, or fever as directed by your caregiver.  Manage underlying conditions as directed by your caregiver.  Eat a healthy, well-balanced diet to maintain strength.  Join a support group or prevention program to cope with the condition or trauma that caused the altered mental status. Ask your caregiver to help choose a program that works for you.  Follow up with your caregiver for further examination, therapy, or testing as directed.  SEEK MEDICAL CARE IF:   You feel unwell or have chills.  You or your family notice a change in your behavior or your  alertness.  You have trouble following your caregiver's treatment plan.  You have questions or concerns.  SEEK IMMEDIATE MEDICAL CARE IF:   You have a rapid heartbeat or have chest pain.  You have difficulty breathing.  You have a fever.  You have a headache with a stiff neck.  You cough up blood.  You have blood in your urine or stool.  You have severe agitation or confusion.  MAKE SURE YOU:   Understand these instructions.  Will watch your condition.  Will get help right away if you are not doing well or get worse.     This information is not intended to replace advice given to you by your health care provider. Make sure you discuss any questions you have with your health care provider.     Document Released: 06/07/2011 Document Revised: 03/11/2013 Document Reviewed: 02/11/2016  [employer] Interactive Patient Education ©2016 [employer]

## 2023-09-04 NOTE — PROGRESS NOTES
Discharge instructions provided to patient. Discussed POC with patient. Meds to beds will be provided. Patient states he has a bus pass and 200 dollars to make his way back to [facility] where he has his car.

## 2023-09-04 NOTE — HOSPITAL COURSE
Mr. Orlando Faustin is a 73 y.o. male with past medical history of type 2 diabetes, recurrent colorectal cancer T3N2 status postchemotherapy, scheduled for radiation, CAD with stents placement, hypertension, bilateral internal carotid artery stenosis (greater than 70% of the left side), peripheral artery disease who presented 9/3/2023 with reported altered mental status.    Patient was brought from [facility] with concern for possible altered mental status.  It was reported that patient was he is well and has been looking for it for 3 days, staying in the other people tents, and eventually was drawn attention of medical team.  It was thought that he was alert oriented x1 today, however currently is alert active x4, although may be slightly confused.  He denies any confusion.  He denies to have focal deficit.  He states that he has been drinking viktoria 1-2 drinks a day, otherwise denies drinking or using any drugs.  He has been off his medication for diabetes and hypertension for 3 days, as his medications were in his van.  In ER he has blood pressure 180/84, UDS negative, TSH within normal limits, UA negative for infection, alcohol is not elevated chemistry panel without acute abnormalities.  CT head without contrast showed no acute intracranial abnormality.  Chest x-ray showed no acute abnormality.  EKG without acute T/ST changes, some nonspecific T wave flattening noted.  Requested admission for observation.    Patient monitored overnight with no events noted.  Patient was rehydrated, blood pressure was controlled, and sugars were well maintained.  Patient's symptoms likely due to the cost that he has not taken his medication for the past 3 days as he was unable to get back to his van at the [facility] for [facility].    Patient seen and examined prior to being discharged.  Reordered all of patient's medication including blood sugar monitoring system.  Patient will need to follow-up with his PCP in [place] for management of care.  All medications patient normally takes has been provided for the next 30 days until he is able to get to his medication.  All questions and concerns answered prior during discharge.  Patient discharged home.

## 2023-09-04 NOTE — DISCHARGE SUMMARY
Discharge Summary    CHIEF COMPLAINT ON ADMISSION  Chief Complaint   Patient presents with    Other       Reason for Admission  ems     Admission Date  9/3/2023    CODE STATUS  Full Code    HPI & HOSPITAL COURSE    Mr. Orlando Faustin is a 73 y.o. male with past medical history of type 2 diabetes, recurrent colorectal cancer T3N2 status postchemotherapy, scheduled for radiation, CAD with stents placement, hypertension, bilateral internal carotid artery stenosis (greater than 70% of the left side), peripheral artery disease who presented 9/3/2023 with reported altered mental status.    Patient was brought from [facility] with concern for possible altered mental status.  It was reported that patient was he is well and has been looking for it for 3 days, staying in the other people tents, and eventually was drawn attention of medical team.  It was thought that he was alert oriented x1 today, however currently is alert active x4, although may be slightly confused.  He denies any confusion.  He denies to have focal deficit.  He states that he has been drinking viktoria 1-2 drinks a day, otherwise denies drinking or using any drugs.  He has been off his medication for diabetes and hypertension for 3 days, as his medications were in his van.  In ER he has blood pressure 180/84, UDS negative, TSH within normal limits, UA negative for infection, alcohol is not elevated chemistry panel without acute abnormalities.  CT head without contrast showed no acute intracranial abnormality.  Chest x-ray showed no acute abnormality.  EKG without acute T/ST changes, some nonspecific T wave flattening noted.  Requested admission for observation.    Patient monitored overnight with no events noted.  Patient was rehydrated, blood pressure was controlled, and sugars were well maintained.  Patient's symptoms likely due to the cost that he has not taken his medication for the past 3 days as he was unable to get back to his van at the [facility] for  [facility].    Patient seen and examined prior to being discharged.  Reordered all of patient's medication including blood sugar monitoring system.  Patient will need to follow-up with his PCP in [facility] for management of care.  All medications patient normally takes has been provided for the next 30 days until he is able to get to his medication.  All questions and concerns answered prior during discharge.  Patient discharged home.    Therefore, he is discharged in good and stable condition to home with close outpatient follow-up.    The patient recovered much more quickly than anticipated on admission.    Discharge Date  09/04/23      FOLLOW UP ITEMS POST DISCHARGE  Please call [phone] to schedule PCP appointment for patient.    Required specialty appointments include:       Discharge Instructions per FELISA QuickPJerseyRJerseyNJersey    -Follow-up with PCP in [facility] for management of care  -All medications including insulin, antihypertensive, glucometers, and diabetic supplies all have been prescribed for the next 30 days  -Continue all home medications as indicated  -Monitor your blood pressure and blood sugars at least twice daily    DIET: Diabetic and cardiac    ACTIVITY: As tolerated    DIAGNOSIS: Altered mentation    Return to ER if symptoms persist, chest pain, palpitations, shortness of breath, numbness, tingling, weakness, and high fevers.    DISCHARGE DIAGNOSES  Principal Problem (Resolved):    AMS (altered mental status) (POA: Yes)  Active Problems:    Hypertension (POA: Unknown)    Personal history of rectal cancer (POA: Unknown)    Diabetes type 2, controlled (HCC) (POA: Unknown)      FOLLOW UP  No future appointments.  No follow-up provider specified.    MEDICATIONS ON DISCHARGE     Medication List        START taking these medications        Instructions   Alcohol Swabs   Doctor's comments: Per formulary preference. ICD-10 code: E11.65 Uncontrolled type 2  Diabetes Mellitus  Wipe site with prep pad prior to injection.     * Blood Glucose Meter Kit   Doctor's comments: Or per formulary preference. ICD-10 code: E11.65 Uncontrolled type 2 Diabetes Mellitus  Test blood sugar as recommended by provider. One Touch Verio Flex blood glucose monitoring kit.     * Blood Glucose Test Strips   Doctor's comments: Or per formulary preference. ICD-10 code: E11.65 Uncontrolled type 2 Diabetes Mellitus  Use one One Touch Verio Flex strip to test blood sugar three times daily before meals.     Insulin Syringes U-100 0.5 mL   Doctor's comments: Per formulary preference. ICD-10 code: E11.65 Uncontrolled type 2 Diabetes Mellitus  Use one syringe to inject insulin twice daily.     Lancets   Doctor's comments: Or per formulary preference. ICD-10 code: E11.65 Uncontrolled type 2 Diabetes Mellitus  Use one One Touch Verio Flex lancet to test blood sugar three times daily before meals.           * This list has 2 medication(s) that are the same as other medications prescribed for you. Read the directions carefully, and ask your doctor or other care provider to review them with you.                CONTINUE taking these medications        Instructions   amLODIPine 5 MG Tabs  Commonly known as: Norvasc   Take 1 Tablet by mouth every day for 30 days.  Dose: 5 mg     insulin aspart 100 UNIT/ML Soln  Commonly known as: NovoLOG   Inject 10-25 Units under the skin 2 times a day for 30 days. 25 units = AM  10 units = PM  Dose: 10-25 Units     isosorbide mononitrate SR 30 MG Tb24  Commonly known as: Imdur   Take 1 Tablet by mouth every morning for 30 days.  Dose: 30 mg     Lantus SoloStar 100 UNIT/ML Sopn injection  Generic drug: insulin glargine   Inject 35 Units under the skin 2 times a day for 30 days.  Dose: 35 Units     lisinopril 40 MG tablet  Commonly known as: Prinivil   Take 1 Tablet by mouth every day for 30 days.  Dose: 40 mg     metoprolol SR 25 MG Tb24  Commonly known as: Toprol XL   Take 1  Tablet by mouth every day for 30 days.  Dose: 25 mg     rosuvastatin 10 MG Tabs  Commonly known as: Crestor   Take 1 Tablet by mouth every evening for 30 days.  Dose: 10 mg     SITagliptin 100 MG Tabs  Commonly known as: Januvia   Take 1 Tablet by mouth every day for 30 days.  Dose: 100 mg     ticagrelor 60 MG Tabs tablet  Commonly known as: Brilinta   Take 1 Tablet by mouth 2 times a day for 30 days.  Dose: 60 mg              Allergies  No Known Allergies    DIET  Orders Placed This Encounter   Procedures    Diet Order Diet: Regular     Standing Status:   Standing     Number of Occurrences:   1     Order Specific Question:   Diet:     Answer:   Regular [1]       ACTIVITY  As tolerated.  Weight bearing as tolerated    CONSULTATIONS  NONE    PROCEDURES  NONE    IMAGING    CT-HEAD W/O   Final Result         1.  No acute intracranial abnormality.         DX-CHEST-PORTABLE (1 VIEW)   Final Result      No acute cardiopulmonary abnormality.            LABORATORY  Lab Results   Component Value Date    SODIUM 139 09/03/2023    POTASSIUM 4.0 09/03/2023    CHLORIDE 107 09/03/2023    CO2 25 09/03/2023    GLUCOSE 151 (H) 09/03/2023    BUN 19 09/03/2023    CREATININE 0.75 09/03/2023        Lab Results   Component Value Date    WBC 6.4 09/03/2023    HEMOGLOBIN 12.2 (L) 09/03/2023    HEMATOCRIT 36.9 (L) 09/03/2023    PLATELETCT 171 09/03/2023

## 2023-09-08 LAB
BACTERIA BLD CULT: NORMAL
BACTERIA BLD CULT: NORMAL
SIGNIFICANT IND 70042: NORMAL
SIGNIFICANT IND 70042: NORMAL
SITE SITE: NORMAL
SITE SITE: NORMAL
SOURCE SOURCE: NORMAL
SOURCE SOURCE: NORMAL